# Patient Record
Sex: FEMALE | Race: OTHER | HISPANIC OR LATINO | URBAN - METROPOLITAN AREA
[De-identification: names, ages, dates, MRNs, and addresses within clinical notes are randomized per-mention and may not be internally consistent; named-entity substitution may affect disease eponyms.]

---

## 2023-01-27 ENCOUNTER — EMERGENCY (EMERGENCY)
Facility: HOSPITAL | Age: 23
LOS: 1 days | Discharge: HOME | End: 2023-01-27
Attending: STUDENT IN AN ORGANIZED HEALTH CARE EDUCATION/TRAINING PROGRAM
Payer: COMMERCIAL

## 2023-01-27 VITALS
HEART RATE: 89 BPM | DIASTOLIC BLOOD PRESSURE: 71 MMHG | RESPIRATION RATE: 18 BRPM | TEMPERATURE: 98 F | SYSTOLIC BLOOD PRESSURE: 108 MMHG | OXYGEN SATURATION: 97 % | WEIGHT: 110.23 LBS

## 2023-01-27 PROCEDURE — 99283 EMERGENCY DEPT VISIT LOW MDM: CPT

## 2023-01-27 PROCEDURE — 96372 THER/PROPH/DIAG INJ SC/IM: CPT

## 2023-01-27 PROCEDURE — 99284 EMERGENCY DEPT VISIT MOD MDM: CPT

## 2023-01-27 RX ORDER — KETOROLAC TROMETHAMINE 30 MG/ML
30 SYRINGE (ML) INJECTION ONCE
Refills: 0 | Status: DISCONTINUED | OUTPATIENT
Start: 2023-01-27 | End: 2023-01-27

## 2023-01-27 RX ORDER — LIDOCAINE 4 G/100G
1 CREAM TOPICAL ONCE
Refills: 0 | Status: DISCONTINUED | OUTPATIENT
Start: 2023-01-27 | End: 2023-01-27

## 2023-01-27 RX ORDER — METHOCARBAMOL 500 MG/1
2 TABLET, FILM COATED ORAL
Qty: 18 | Refills: 0
Start: 2023-01-27 | End: 2023-01-29

## 2023-01-27 RX ORDER — METHOCARBAMOL 500 MG/1
1500 TABLET, FILM COATED ORAL ONCE
Refills: 0 | Status: COMPLETED | OUTPATIENT
Start: 2023-01-27 | End: 2023-01-27

## 2023-01-27 RX ORDER — IBUPROFEN 200 MG
1 TABLET ORAL
Qty: 20 | Refills: 0
Start: 2023-01-27 | End: 2023-01-31

## 2023-01-27 RX ADMIN — METHOCARBAMOL 1500 MILLIGRAM(S): 500 TABLET, FILM COATED ORAL at 17:23

## 2023-01-27 RX ADMIN — Medication 30 MILLIGRAM(S): at 17:23

## 2023-01-27 RX ADMIN — Medication 30 MILLIGRAM(S): at 17:35

## 2023-01-27 NOTE — ED PROVIDER NOTE - PATIENT PORTAL LINK FT
You can access the FollowMyHealth Patient Portal offered by [facility] by registering at the following website: [email or web address]. By joining [employer]’s FollowMyHealth portal, you will also be able to view your health information using other applications (apps) compatible with our system.

## 2023-01-27 NOTE — ED PROVIDER NOTE - NSFOLLOWUPINSTRUCTIONS_ED_ALL_ED_FT
Kaiden un seguimiento con saavedra médico de atención primaria dentro de los 7 días. Si no tiene leigh, puede llamar al número de la oficina de [facility] a continuación y hacer luz maria edu.    [facility]  [facility]  [address] [facility] [address]  Teléfono: [phone]  Fax: [phone]    Medicamentos para el dolor enviados a la farmacia por usted. Mañana estarás muy adolorido, esto es normal.    Follow up with your Primary Care Doctor within 7 days. If you do not have one, you can call the Internal Medicine office number below and make an appointment.    [facility]  Internal Medicine  [address] [place] [address]  Phone: [phone]  Fax: [phone]     Pain medication sent to the pharmacy for you. Tomorrow you will be very sore, this is normal.     Dolor de espalda    El dolor de espalda puede devon miedo. Eleazar incluso cuando el dolor es intenso, por lo general desaparece por sí solo en unas pocas semanas. Los casos que requieren atención urgente o cirugía son raros. No asumas lo peor. Miracle todo el stacie tiene dolor de espalda en algún momento.    Consulte a saavedra médico o enfermera si tiene dolor de espalda y usted:    -Recientemente tuvo luz maria caída o luz maria lesión en la espalda    -Tiene entumecimiento o debilidad en las piernas    -Tiene problemas con el control de la vejiga o el intestino    -Tiene pérdida de peso inexplicable    -Tener fiebre o sentirse enfermo de otras maneras    -Prasanth medicamentos esteroides, cony prednisona, de forma regular.    -Tiene diabetes o un problema médico que debilita saavedra sistema inmunológico    -Tener antecedentes de cáncer u osteoporosis.    También debe consultar a un médico si:    -Saavedra dolor de espalda es tan severo que no puede realizar tareas simples    -Saavedra dolor de espalda no comienza a mejorar en 4 semanas    Muchas cosas diferentes pueden causar dolor lumbar. La mayoría de las veces, los médicos no conocen la causa exacta.    El dolor de espalda puede ocurrir si se esfuerza un músculo. [place] es a menudo lo que valentine sucedido cuando luz maria persona "se gerry" la espalda. [place] se refiere al dolor que comienza repentinamente después de la actividad física, cony levantar algo pesado o doblar la espalda.    El dolor de espalda también puede ocurrir si tiene:    -Discos dañados, abultados o rotos    -Artritis que afecta las articulaciones de la columna vertebral    - Crecimientos óseos en las vértebras que aprietan los nervios cercanos    -Luz Maria vértebra fuera de lugar    -Estrechamiento en el canal becker    -Un tumor o infección (eleazar esto es muy raro)    La mayoría de las personas con un episodio de dolor lumbar no tienen un problema médico grave y pueden probar tratamientos simples cony:    -Mantenerse activo: lo mejor que puede hacer es mantenerse lo más activo posible. Las personas con dolor lumbar se recuperan más rápido si se mantienen activas. Si saavedra dolor es intenso, es posible que deba descansar edith leigh o dos días. Eleazar es importante volver a caminar y moverse lo antes posible. Si marty debe evitar el levantamiento de objetos pesados ??y los deportes mientras le duele la espalda, intente continuar con chiki actividades diarias normales.    -Calor: a algunas personas les resulta útil usar luz maria almohadilla térmica o luz maria envoltura térmica. Tenga cuidado de evitar los ajustes de calor alto para evitar quemaduras en la piel.    -Medicamentos: roland, puede probar analgésicos que puede obtener sin receta médica. En muchos casos, los médicos sugieren probar roland un medicamento antiinflamatorio no esteroideo o "TORITO". Los TORITO incluyen ibuprofeno (marcas de muestra: Advil, Motrin) y naproxeno (marcas de muestra: Aleve). Estos podrían funcionar mejor que el paracetamol (Tylenol) para el dolor de espalda.    -Tratamientos para ayudar con los síntomas: algunos tratamientos pueden ayudarlo a sentirse mejor por un tiempo. Incluyen:    -Manipulación de la columna: esto es cuando un quiropráctico, fisioterapeuta u otro profesional mueve o "ajusta" las articulaciones de la espalda. Si quiere probar esto, hable roland con saavedra médico o enfermera.    -Acupuntura: esto es cuando alguien que conoce la medicina tradicional china inserta pequeñas agujas en saavedra cuerpo para bloquear las señales de dolor.    -Masaje    Si marty el dolor de espalda generalmente desaparece en unas pocas semanas, algunas personas continúan teniendo dolor por más tiempo. En concepcion sujit, los tratamientos adicionales pueden incluir:    -Cuidado propio: esto implica ser consciente de saavedra dolor. Si marty debe descansar cuando lo necesite, es importante mantenerse activo tanto cony pueda. Cosas cony aplicar calor y hacer estiramientos suaves también pueden ayudarlo a sentirse mejor.    -Fisioterapia: un fisioterapeuta es un experto en ejercicios que puede enseñarle estiramientos y movimientos para ayudarlo a fortalecer chiki músculos. El objetivo es aliviar el dolor eleazar también ayudarlo a volver a chiki actividades normales. Los ejercicios que puede probar incluyen caminar, nadar o usar luz maria bicicleta estática. Algunas personas también encuentran que el [employer] o el yoga pueden ayudar con el dolor de espalda. Encontrar actividades que disfrute puede ayudarlo a mantenerse activo.    -Reducir el estrés: a algunas personas les resulta útil probar algo llamado "reducción del estrés basada en la atención plena". [place] implica asistir a un programa grupal para practicar la relajación y la meditación. Si saavedra dolor de espalda lo hace sentir ansioso o deprimido, hable con saavedra médico o enfermera. Existen otros tratamientos que pueden ayudar con estos problemas.
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
## 2023-01-27 NOTE — ED PROVIDER NOTE - CHPI ED SYMPTOMS NEG
no disorientation/no dizziness/no laceration/no loss of consciousness/no neck tenderness/no bruising/no crying/no decreased eating/drinking/no difficulty bearing weight/no fussiness/no sleeping issues

## 2023-01-27 NOTE — ED ADULT NURSE NOTE - CAS DISCH BELONGINGS RETURNED
The scribe's documentation has been prepared under my direction and personally reviewed by me in its entirety. I confirm that the note above accurately reflects all work, treatment, procedures, and medical decision making performed by me. Not applicable
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
## 2025-05-12 NOTE — ED PROVIDER NOTE - MDM ORDERS SUBMITTED SELECTION
CC: Back pain follow-up    HPI:  Mariya is a 16-year-old female who presents for back pain follow-up as well as MRI review.  Sacrum and left hip MRIs are negative for stress fracture.  She has been able to walk better for the last few days.  After completing the Medrol Dosepak, she feels 25% better.  Pain is localized to the lower lumbar spine and radiates down left posterior hip.  Active hip flexion increases pain in spine.  Denies any bowel or bladder dysfunction.  Denies any paresthesias or strength deficiencies in lower extremities other than being limited by pain.      Patient Active Problem List    Diagnosis Date Noted    History of MRSA infection 12/07/2023    Dairy product intolerance 01/18/2023    High serum low-density lipoprotein (LDL) 06/24/2022    Vitamin D deficiency 11/22/2021       Current Outpatient Medications   Medication Sig Dispense Refill    methylPREDNISolone (MEDROL DOSEPAK) 4 MG Tablet Therapy Pack As directed on the packaging label. 21 Tablet 0    naproxen (NAPROSYN) 250 MG Tab Take 250 mg by mouth 2 times a day with meals.      fluticasone (FLONASE) 50 MCG/ACT nasal spray Administer 1 Spray into affected nostril(S) every day. 16 g 1    cetirizine (ZYRTEC) 10 MG Tab Take 1 Tablet by mouth every day. 30 Tablet 2    acetaminophen (TYLENOL) 160 MG/5ML Suspension Take 15 mg/kg by mouth every four hours as needed.      ibuprofen (MOTRIN) 100 MG/5ML Suspension Take 10 mg/kg by mouth every 6 hours as needed.      amoxicillin (AMOXIL) 125 mg/5 mL Recon Susp Take 50 mg/kg/day by mouth 3 times a day. (Patient not taking: Reported on 5/12/2025)      mupirocin (BACTROBAN) 2 % Ointment Apply 1 Application topically 2 times a day. (Patient not taking: Reported on 5/12/2025) 22 g 0     No current facility-administered medications for this visit.        Dairy food allergy    Social History     Socioeconomic History    Marital status: Single     Spouse name: Not on file    Number of children: Not on file  "   Years of education: Not on file    Highest education level: Not on file   Occupational History    Not on file   Tobacco Use    Smoking status: Never    Smokeless tobacco: Never   Vaping Use    Vaping status: Never Used   Substance and Sexual Activity    Alcohol use: Never    Drug use: Never    Sexual activity: Never   Other Topics Concern    Not on file   Social History Narrative    Not on file     Social Drivers of Health     Financial Resource Strain: Not on file   Food Insecurity: No Food Insecurity (2/5/2025)    Hunger Vital Sign     Worried About Running Out of Food in the Last Year: Never true     Ran Out of Food in the Last Year: Never true   Transportation Needs: Not on file   Physical Activity: Not on file   Stress: Not on file   Intimate Partner Violence: Not on file   Housing Stability: Not on file       Family History   Problem Relation Age of Onset    No Known Problems Mother     Asthma Father     Asthma Sister     No Known Problems Brother        Past Surgical History:   Procedure Laterality Date    [place] UPPER GI ENDOSCOPY,DIAGNOSIS N/A 1/16/2023    Procedure: ESOPHAGOGASTRODUODENOSCOPY;  Surgeon: Adalgisa Meier M.D.;  Location: SURGERY SAME DAY [facility];  Service: Pediatric Gastrointestinal    OK UPPER GI ENDOSCOPY,BIOPSY N/A 1/16/2023    Procedure: GASTROSCOPY, WITH BIOPSY;  Surgeon: Adalgisa Meier M.D.;  Location: SURGERY SAME DAY [facility];  Service: Pediatric Gastrointestinal    DENTAL SURGERY      x2       ROS:    See HPI above. All other systems were reviewed and are negative.    BP 94/66 (BP Location: Left arm, Patient Position: Sitting, BP Cuff Size: Adult)   Pulse 86   Temp 37.3 °C (99.2 °F) (Temporal)   Resp 16   Ht 1.61 m (5' 3.39\")   Wt 55.1 kg (121 lb 7.6 oz)   SpO2 97%   BMI 21.26 kg/m²     Physical Exam:  Gen:  Alert, active, acute distress when changing positions due to low back pain  Neck:  Supple, full range of motion, mild tenderness to palpation in paraspinal to " C-spine no lymphadenopathy  Lungs:  Clear to auscultation bilaterally, no wheezes/rales/rhonchi  CV:  Regular rate and rhythm. Normal S1/S2.  No murmurs.  Good pulses throughout.  Brisk capillary refill.  Back:   Tenderness to palpation in lumbar spine L3-L5 and associated paraspinal muscles  Pain increased with resisted left hip flexion  Negative straight leg raise bilaterally  Positive stork test bilaterally, left worse than right  5 out of 5 strength with bilateral knee flexion/extension, plantarflexion, dorsiflexion, big toe extension  Sensory intact in lower extremities  2+ symmetric pulses and normal cap refill in lower extremities  Symmetric 1+ patellar reflexes    Hip exam limited because she feels pain in back with hip flexion     Ext:  WWP, no cyanosis, no edema  Skin:  No rashes or bruising.    MRI of pelvis: Moderate facet arthropathy at L5-S1. Lumbosacral transitional anatomy.   MRI of left hip: Within normal limits    Assessment and Plan:    Low back pain and left sided pelvis pain  Reviewed MRIs together in clinic    MRIs of low back, pelvis, left hip reviewed with Dr. Rubio, pediatric orthopedic surgeon.  No stress fractures identified.  Patient feeling better after Medrol Dosepak.  She is still in pain, but able to walk now.  She will continue with activity restriction and physical therapy at this time.  Discussed strict return precautions such as worsening pain, paresthesias, muscle weakness, new limp, any other concern.    Follow-up in 3 weeks, sooner for new and or worsening symptoms    Total encounter time care for patient today is  30 minutes      Karla Cornejo MD     Medications